# Patient Record
(demographics unavailable — no encounter records)

---

## 2019-10-14 NOTE — EKG
[facility]

              [address] [place] [address]

Test Date:    2019-10-14               Test Time:    17:00:28

Pat Name:     JEREMY DAVIS            Department:   

Patient ID:   [id number]           Room:          

Gender:                               Technician:   

:          1969               Requested By: FRENCH GÓMEZ

Order Number: [id number]           Reading MD:   Shun Roth

                                 Measurements

Intervals                              Axis          

Rate:         91                       P:            41

NV:           182                      QRS:          53

QRSD:         100                      T:            24

QT:           332                                    

QTc:          410                                    

                           Interpretive Statements

SINUS RHYTHM

NONSPECIFIC ST-T WAVE CHANGES

POOR R WAVE PROGRESSION

NO OLD EKGS AVAILABLE



Electronically Signed On 10- 15:36:12 CDT by Shun Roth

## 2019-10-21 NOTE — NUR
received from recovery. he is alert and oriented x4.  are equal and strong. he denies  
numbness and tenderness. he is rating his pain 2/10. mainly in the neck. tolerating and 
drinking fluids well.  he was up to the bathroom and voided large amount.states he feels 
like he emptied his bladder. family remains at bedside.

## 2019-10-21 NOTE — PREOP HP
DATE OF SERVICE:  10/21/2019



ANTICIPATED DATE OF SURGERY:  10/21/2019



HISTORY OF PRESENT ILLNESS:  The patient is a pleasant 50-year-old who has

difficulty with neck pain and pain that radiates into his right shoulder and

right scapula as well as numbness radiating down his arm to his hand.  He says

the scapular pain is a stabbing and burning pain.  The problem began out of this

and started earlier during a donkey race.  He said he fell and the next day he

developed neck pain and arm pain.  He denies weakness.  He says sitting and

driving makes the pain worse.  He can rate his pain as a 9/10 at its worst.  Ice

helps him minimally.  He takes Tylenol and Aleve.  He did see a chiropractor and

has been doing exercises that were given to him with no significant improvement.

 He had cervical epidural steroid injections that only helped for 1 day.



PAST MEDICAL HISTORY:  Hypertension and neck injury.



PAST SURGICAL HISTORY:  Removal of a bone spur in the right shoulder in 2016.



FAMILY HISTORY:  Cancer, diabetes, heart disease, and hypertension.



SOCIAL HISTORY:  Employed as an auctioneer.  .  He chews tobacco for 30

years.  Drinks alcohol 1-2 times per year.



ALLERGIES:  PENICILLIN.



CURRENT MEDICATIONS:  Tylenol, Aleve, oxybutynin, lisinopril,

hydrochlorothiazide, amlodipine besylate, and Zyrtec.



REVIEW OF SYSTEMS:  A 12-point review of systems was obtained and is

noncontributory except that mentioned above.



PHYSICAL EXAMINATION:

NEUROSURGERY EXAMINATION:

GENERAL APPEARANCE:  Alert, pleasant, and in no acute distress.

HEAD:  Normocephalic and atraumatic.

NECK AND THYROID:  Mild-to-moderate tenderness with palpation of posterior

cervical region.

SKIN:  Warm and dry.

MUSCULOSKELETAL:  Cervical range of motion is restricted, cervical paraspinal

muscle bulk is normal, and normal range of motion of the upper extremities

bilaterally.

EXTREMITIES:  No clubbing, cyanosis, or edema.

NEUROLOGIC:  Alert and oriented x 3.  Normal recent and remote memory.  Speech

is clear.  Strength 5/5 in upper and lower extremities bilaterally.  Sensory

intact to light touch in the upper and lower extremities except decreased

sensation in the right forearm.  Reflexes present and symmetric in upper and

lower extremities.  Normal gait.



IMAGING:  I reviewed a cervical MRI scan.  On that study, there are

multilevel degenerative changes.  There is disk bulging on the right at C5-6 and

C6-7 with moderate foraminal narrowing at this level associated with central

stenosis.



ASSESSMENT/PLAN:  He is having significant radicular symptoms.  He has not 
improved with

conservative measures including rest, chiropractic treatment, physical therapy

exercises, or cervical epidural steroid injections.  This problem has been

present since 08/03/2019 and is slowly worsening.  I spoke with him regarding an

anterior cervical microdiscectomy and fusion at C5-6 and C6-7.  I have reviewed

the rationale, technique, and expected postoperative course with the patient. 

We spoke about the risks of the surgery including the injury to the soft tissue

structures of the neck, paralysis, as well as hoarseness.  He understands and he

would like to proceed.  We will make the arrangements.

 



______________________________

FRENCH GÓMEZ MD



DR:  MARIAM/karl  JOB#:  [id number] / [id number]

DD:  10/15/2019 13:16  DT:  10/15/2019 13:33

WAN

## 2019-10-21 NOTE — NUR
ambulated in hallway around nurses station x2. steady gait. continues to rate his pain 
around "2" he has voided x 2 150 and 200 cc yellow urine.reviewed orally discharge 
instructions.

## 2019-10-22 NOTE — DISCH
DISCHARGE INSTRUCTIONS


Condition on Discharge


Condition on Discharge:  Stable





Activity After Discharge


Activity Instructions for Disc:  Activity as tolerated, Avoid exertion


Other activity instructions:  no driving for a week


Bathing Instructions:  Shower-keep dressing dry


Lifting Instructions after Dis:  No heavy lifting, No pulling or pushing, Do not

lift >10 pounds





Diet after Discharge


Additional Diet Restrictions:  resume home diet





Wound Incision Care


Wound/Incision Care:  Ice to area for comfort


Other wound/incision instructi:  may remove dressing tomorrow if dry then may 

shower, no soaking





Contacting the  after DC


Call your doctor for:  Concerns you may have





Follow-Up


Follow up with:  Dr. Gómez's nurse in 2 weeks [phone]











FRENCH GÓMEZ MD            Oct 22, 2019 09:21

## 2019-10-22 NOTE — PATHOLOGY
[facility] Accession Number: [id number]

.                                                                01

Material submitted:                                        .

vertebral column - CERVICAL DISC

.                                                                01

Clinical history:                                          .

Cervical herniated disc with radiculopathy, stenosis.

.                                                                02

**********************************************************************

Diagnosis:

Segments of cartilaginous tissue and bone, cervical disc:

- Degenerative changes of cartilaginous tissue.

(JPM:; 10/22/2019)

MBR  10/22/2019  1542 Local

**********************************************************************

.                                                                02

Comment:

There is no evidence of an acute inflammatory process or malignancy.

(JPM:; 10/22/2019)

.                                                                02

Electronically signed:                                     .

Negro Amaya MD, Pathologist

NPI- [id number]

.                                                                01

Gross description:                                         .

Received in formalin labeled "Clemente Sauceda, cervical disc" is a 3.8 x

2.5 x 0.8 cm aggregate of tan-white rubbery soft tissue and scant bone

fragments.  Representative tissue is submitted in cassette A1. ([facility];

10/21/2019)

[facility]  10/21/2019  1930 Local

.                                                                02

Pathologist provided ICD-10:

M50.20, M54.12, M99.71

.                                                                02

CPT                                                        .

[id number]

Specimen Comment: A courtesy copy of this report has been sent to

Specimen Comment: [phone].

Specimen Comment: Report sent to 

***Performed at:  01

   [facility]

   [address], [place]  [id number]

   MD Zain Molina MD Phone:  [phone]

***Performed at:  02

   [facility]

   [address] [place]  [id number]

   MD Negro Amaya MD Phone:  [phone]

## 2019-10-22 NOTE — NUR
SS following for discharge planning. SS reviewed pt chart. Pt is from home and is currently 
on room air. Discharge order on the chart for home with self care.

## 2019-10-22 NOTE — NUR
Discharge instructions and belongings reviewed with patient, verbalized understanding.  
Patient was escorted out via ambulation by Linnette GOMEZ accompanied by his wife.

## 2019-10-25 NOTE — OP
DATE OF SURGERY:  10/21/2019



PREOPERATIVE DIAGNOSES:  Herniated cervical disc at C5-C6, C6-C7 with

right-sided foraminal narrowing and right cervical radiculopathy.



POSTOPERATIVE DIAGNOSES:  Herniated cervical disc at C5-C6, C6-C7 with

right-sided foraminal narrowing and right cervical radiculopathy.



OPERATION PERFORMED:  Anterior cervical microdiscectomy C5-C6, C6-C7; anterior

cervical interbody fusion, C5-C6, C6-C7 with allograft and autograft bone,

anterior cervical plate, C5, C6, and C7.  I used the YeHive system.  The

operation was done with EMG monitoring, SSEP monitoring, NIMS monitoring, motor

evoked potentials, fluoroscopy, microscopic dissection.



SURGEON: Juan Gómez M.D.



ASSISTANT: MICHAEL Monzon.



OPERATIVE INDICATIONS:  The patient is a pleasant 50-year-old man who developed

intractable neck and right arm pain, which failed conservative measures.  On

imaging studies, he was found to have the above-mentioned findings and I

recommended anterior neck surgery.  I did speak with him about the surgery, the

risks, technique, and the expected postoperative course and he wished to go

ahead.



DESCRIPTION OF PROCEDURE:  Following general endotracheal anesthesia, the

patient was positioned supine on the operating room table and the anterior

cervical region was then prepped and draped in a standard fashion.  His head was

in a neutral position.  ALANA hose and AV impulse boots were applied for DVT

prophylaxis.  The microscope was draped.  Fluoroscopy was draped and brought

into the field.  Monitoring was established.  Ancef 3 g was given less than 1

hour prior to initiation of the surgery.  Using fluoroscopic guidance, an

incision was made from the midline around toward the right side in the skin

crease over the C6-C7 interspace.  I dissected down through skin and

subcutaneous tissue and worked around the medial aspect of sternocleidomastoid

and carotid artery sheath, down to the anterior cervical vertebral body.  I 
placed

long anterior cervical retractors and exposed C5-C6.  Then, I incised the

anterior annulus and brought the microscope in at this point.  I did place 14 mm

pins in C5 and C6 and distracted the disc space.  I performed a generous

discectomy with pituitary rongeurs as well as endplate scrapers.  Posteriorly, I

drilled the posterior spurring and then opened the annulus and ligament and

worked laterally bilaterally, and then I assured myself that the foramina were

open.  I scraped and placed a straight Camber cage of 6 mm, which was gently

tapped into position and packed with allograft and autograft bone.  The

autograft bone, I obtained by saving the bone from shaving of the bone spurs

with a high speed air drill.  Prior to placement of the anterior cage, I assured

myself of absolutely perfect hemostasis and then once the cage was placed, I

moved down to C6-C7 and I performed the identical operation at C6-C7.  There was

considerable degenerative arthritis and posteriorly, the annulus was partly

calcified.  The ligament was scarred to the underlying dura, which was

problematic, but I was able to develop a plane between the ligament and the

dura, and I worked and opened widely bilaterally and I assured myself that the

area was fully decompressed.  Again, I scraped cartilaginous endplate.  Again, I

tapped in the interbody fusion cage, packed with allograft and autograft bone. 

I onlayed an anterior plate and placed six #14 mm screws and as I worked, the

plate moved down and it was positioned nicely.  I irrigated with antibiotic

solution.  I removed the retractors, did Valsalva the patient and assured myself

of perfect hemostasis.  I then closed the wound in layers with absorbable

sutures.  The skin was closed with 4-0 subcuticular stitch.  The operation went

very well and the patient awakened uneventfully and taken to recovery room in

excellent condition.  I was quite pleased with the surgery.

 



______________________________

JUAN GÓMEZ MD



DR:  MARIAM/karl  JOB#:  [id number] / [id number]

DD:  10/25/2019 12:36  DT:  10/25/2019 13:33

WAN